# Patient Record
(demographics unavailable — no encounter records)

---

## 2024-10-09 NOTE — HISTORY OF PRESENT ILLNESS
[FreeTextEntry1] : Mr. 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 His other comorbidity includes erythromelalgias (leading to foot swelling and pain) but as per neurologist is neuropathy (not erythromelalgias). Followed by Dr. Thayer (primary cardiologist). Accompanied by wife.   For full initial details, please refer to note from 1/24/23.   Since last visit, weight has reduced to 176 pounds. Previously MEMS was discussed but they were not interested.   He was seen by neurologist who rpeviously was treating him for neuropathy with some infusion (stopped July as was ineffective and was causing diaphoresis). Continues to have pain. Seen vascular without any intervenable issue. He takes an oxycodone and gabapentin 200 mg qhs to sleep. Reportedly has sundowning at night.   Takes torsemide 40 mg daily with good effect with occasional 60 mg as needed. Does report fluid indiscretion.   He reports a good appetite and has been more compliant with salt limitation. His main complaint is pain in his feet when trying to walk. No recent episodes of gout. As per his wife, he was previously taken off hydralazine due to malar facial rash. Has had worsening cognitive issues.    Reports no vision in R eye from prior stroke; L eye developed cataract and requires surgery. Walks around the house and can walk up to 1 block without dyspnea but is limited by leg pain/weakness. He sleeps with +2 pillows but reports overt orthopnea or PND. Does not check his BP at home. Eats about 3 meals daily and admits to not following a low sodium diet, he drinks about 1.5 liters of fluid a day.   He denies chest pain, palpitations, and no episodes of syncope, no ICD shocks. Reports depression - per wife, had attempted to ingest excess oxycodone so has hid them from him.

## 2024-10-09 NOTE — ASSESSMENT
[FreeTextEntry1] : Mr. 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 His other comorbidity includes erythromelalgias (leading to foot swelling and pain) but as per neurologist is neuropathy (not erythromelalgias). He is ACC/AHA Stage C HF, endorsing NYHA Class II symptoms however unclear if it's related to debility or heart failure. He is mildly volume overloaded and normotensive but with edema in legs R>L.   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 - s/p 2 Mitraclips on 3/31 with improvement to mild MR  3. CKD - Cr 1.5-1.9 - SGLT2i as above  4. VT - s/p ICD for secondary prevention  - no recent therapies   5. Chronic atrial fibrillation. - rate controlled on Toprol XL 50 mg daily - continue AC with Eliquis 5mg BID  6. 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 Depression - encouraged psychiatry consult  RTC 3 months with NP, 6 months with me

## 2024-10-09 NOTE — PHYSICAL EXAM
[Well Developed] : well developed [Well Nourished] : well nourished [No Acute Distress] : no acute distress [No Xanthelasma] : no xanthelasma [Normal S1, S2] : normal S1, S2 [No Gallop] : no gallop [Clear Lung Fields] : clear lung fields [Soft] : abdomen soft [Non Tender] : non-tender [No Rash] : no rash [Moves all extremities] : moves all extremities [No Focal Deficits] : no focal deficits [Normal Speech] : normal speech [Alert and Oriented] : alert and oriented [de-identified] : debilitated [de-identified] : JVP 8-10 cm w/ HJR [de-identified] : irreg irreg rhythm; no m/r/g, Left chest ICD [de-identified] : slow gait, uses a cane [de-identified] : b/l feet blue, edematous up to shins

## 2024-10-09 NOTE — CARDIOLOGY SUMMARY
[de-identified] : 10/9/24 - afib, RV paced 7/10/24 afib, RV paced 4/10/24 AF, RBBB, intermittent paced, rate 56 bpm 1/3/24 - afib, RBBB, intermittent paced 11/8/23  AF 58 with RBBB, LAFB, NSST 9/8/23 - unchanged 8/9/23 AF 86 with RBBB, LAFB, NSST 4/23/23 - afib, RV paced [de-identified] : 11/8/23 - normal LV and RV function, dilated LA, mitral clip with mild-mod MR (eccentric?), mild AI, LVOT VTI 15 cm, IVC small and collapsed  11/17/22 - LVIDD 5.4 cm, LVEF 50%, normal RV size and function mildly dilated LA, RA not well visualized, S/p mitral clip mild regurgitation, mild to moderate aortic regurgitation, mild tricuspid regurgitation.  3/29/22 TTE: RVSP 54mmHg  3/24/22 TTE: grossly normal LVSF, no LVOT obs, RVE, decreased RVSF, nml RA, sev dilated LA, sev MR, mild AR, mild TR,  [de-identified] : \par  3/30/22 LHC: two vessel mild non-obs CAD, LVEDP 8 mmHg

## 2024-10-09 NOTE — ADDENDUM
[FreeTextEntry1] : Called and left a message with partner Rachelle with results of labs notable for mild improvement in BUN/creat 41/1.63 with K 3.9 from 1.90 and serum pro BNP 5800 from 5200. Diuretics were adjusted during the appt.

## 2024-10-30 NOTE — PHYSICAL EXAM
[1+] : left 1+ [Ankle Swelling Bilaterally] : bilaterally  [Ankle Swelling On The Left] : moderate [] : bilaterally [Ankle Swelling On The Right] : mild [Purpura] : purpura [Petechiae] : petechiae [Skin Ulcer] : ulcer [Skin Induration] : induration [Oriented to Person] : oriented to person [Oriented to Place] : oriented to place [Oriented to Time] : oriented to time [Varicose Veins Of Lower Extremities] : not present [FreeTextEntry1] : cap refill >5 seconds. [de-identified] : manual muscle testing 3/5 in all four muscle groups [de-identified] : Purple red discoloration of b/l lower extremities

## 2024-10-30 NOTE — PHYSICAL EXAM
[1+] : left 1+ [Ankle Swelling Bilaterally] : bilaterally  [Ankle Swelling On The Left] : moderate [] : bilaterally [Ankle Swelling On The Right] : mild [Purpura] : purpura [Petechiae] : petechiae [Skin Ulcer] : ulcer [Skin Induration] : induration [Oriented to Person] : oriented to person [Oriented to Place] : oriented to place [Oriented to Time] : oriented to time [Varicose Veins Of Lower Extremities] : not present [FreeTextEntry1] : cap refill >5 seconds. [de-identified] : manual muscle testing 3/5 in all four muscle groups [de-identified] : Purple red discoloration of b/l lower extremities

## 2024-10-30 NOTE — PLAN
[FreeTextEntry1] : 76M presents for left foot wounds and b/l purple, red discoloration - Pt seen and evaluated - Afebrile - Left foot hallux wound aprtial thickness and not beyond, no purulence, no fluctuance, no malodor - Left foot posterior calcaneus wound to dermis and not beyond, no purulence, no malodor - Right heel lateral well adhered eschar, no acute signs of infection  - Discussed the importance of elevating b/l lower extremities - Continue application of mupirocin on wounds  - Continue application of ammonium lactate to apply 2x daily for bilateral lower extremities - Topical Abx to be applied daily to left lower extremity wounds - Recommend consideration of Calcium channel blocker for Renaulds.  - RTC PRN

## 2024-10-30 NOTE — HISTORY OF PRESENT ILLNESS
[FreeTextEntry1] : Pt is seen for follow up management of wounds on the left foot to the hallux and heel. States he is feeling better but still in pain especially the heel. Denies f/c/n/v/d.

## 2024-11-14 NOTE — ASSESSMENT
[FreeTextEntry1] : Plan - Renew prescription for Farxiga. - Nifedipine for Raynaud's - Consider starting patient on an antidepressant, but this decision should be made by a psychiatrist. There is an interaction between sertraline and Cymbalta - Continue HF regimen, not currently decompensated - The patient has no active cardiac conditions, and may safely proceed with the planned procedure, including sedation or GET as clinically indicated. No further cardiac testing is required.

## 2024-11-14 NOTE — REASON FOR VISIT
[Symptom and Test Evaluation] : symptom and test evaluation [Cardiac Failure] : cardiac failure [FreeTextEntry1] : Chief complaint - Patient is experiencing difficulty seeing in both eyes, one due to a stroke and the other due to a cataract.  - Patient is also experiencing pain in his hands and feet, and has wounds on his legs due to poor blood circulation.  - Patient is also experiencing depression. - No angina or dyspnea  History of present illness - Patient has been experiencing pain in his hands and feet, and has wounds on his legs due to poor blood circulation.  - Patient has been experiencing depression. - Patient has been seen by a wound doctor and a vascular doctor, but his condition has not improved.  - Patient has been taking oxycodone for pain relief.   Past surgical history Patient is scheduled for cataract surgery on November 19, 2024.  Social history - Patient is cared for at home by a family member, who is seeking additional assistance due to the patient's deteriorating condition.  - Patient is unable to leave the house due to his condition.  Assessment - Scheduled for cataract surgery - Patient is also experiencing peripheral neuropathy and has ulcers on his lower extremities secondary to peripheral vascular disease. - Patient is also experiencing depression and has expressed suicidal ideation.  Plan - Renew prescription for Farxiga. - Nifedipine for Raynaud's - Consider starting patient on an antidepressant, but this decision should be made by a psychiatrist. There is an interaction between sertraline and Cymbalta - Continue HF regimen, not currently decompensated - The patient has no active cardiac conditions, and may safely proceed with the planned procedure, including sedation or GET as clinically indicated. No further cardiac testing is required.  Prescription - Renewed prescription for Farxiga. - Nifedipine, 30 mg, a calcium channel blocker.  Appointments - Follow-up appointment scheduled for January 5th or 15th, 2025. - Cataract surgery scheduled for November 19, 2024.  ICD-10 codes (7) - Depression, unspecified [F32.A] - Type 2 diabetes mellitus without complications [E11.9] - Essential (primary) hypertension [I10] - Unspecified cataract [H26.9] - Cerebrovascular disease, unspecified [I67.9] - Pain in unspecified hand [M79.643] - Pain in unspecified foot [M79.673]

## 2024-11-14 NOTE — PHYSICAL EXAM
[Well Developed] : well developed [Well Nourished] : well nourished [No Acute Distress] : no acute distress [Normal Conjunctiva] : normal conjunctiva [Normal Venous Pressure] : normal venous pressure [No Carotid Bruit] : no carotid bruit [Normal S1, S2] : normal S1, S2 [No Murmur] : no murmur [No Rub] : no rub [No Gallop] : no gallop [Clear Lung Fields] : clear lung fields [Good Air Entry] : good air entry [No Respiratory Distress] : no respiratory distress  [Soft] : abdomen soft [Non Tender] : non-tender [No Masses/organomegaly] : no masses/organomegaly [Normal Bowel Sounds] : normal bowel sounds [Normal Gait] : normal gait [No Cyanosis] : no cyanosis [No Clubbing] : no clubbing [No Varicosities] : no varicosities [No Focal Deficits] : no focal deficits [Normal Speech] : normal speech [de-identified] : 1+ LE edema to mid shin [de-identified] : Blue discoloration bilaterally to legs and feet, consistent with known vascular disease [de-identified] : Depressed, tearful

## 2025-01-08 NOTE — PHYSICAL EXAM
[Well Developed] : well developed [Well Nourished] : well nourished [No Acute Distress] : no acute distress [No Xanthelasma] : no xanthelasma [Normal S1, S2] : normal S1, S2 [No Gallop] : no gallop [Clear Lung Fields] : clear lung fields [Soft] : abdomen soft [Non Tender] : non-tender [No Rash] : no rash [Moves all extremities] : moves all extremities [No Focal Deficits] : no focal deficits [Normal Speech] : normal speech [Alert and Oriented] : alert and oriented [No Respiratory Distress] : no respiratory distress  [de-identified] : debilitated [de-identified] : JVP 8-10 cm w/ HJR [de-identified] : irreg irreg rhythm; no m/r/g, Left chest ICD [de-identified] : very weak, unable to get onto exam table [de-identified] : b/l feet blue, edematous up to shins, cold LE B/L

## 2025-01-08 NOTE — CARDIOLOGY SUMMARY
[de-identified] : 1/6/25 Unclear baseline, Bradycardia, PRWP, V-Paced?  10/9/24 - afib, RV paced 7/10/24 afib, RV paced 4/10/24 AF, RBBB, intermittent paced, rate 56 bpm 1/3/24 - afib, RBBB, intermittent paced 11/8/23  AF 58 with RBBB, LAFB, NSST 9/8/23 - unchanged 8/9/23 AF 86 with RBBB, LAFB, NSST 4/23/23 - afib, RV paced [de-identified] : 11/8/23 - normal LV and RV function, dilated LA, mitral clip with mild-mod MR (eccentric?), mild AI, LVOT VTI 15 cm, IVC small and collapsed  11/17/22 - LVIDD 5.4 cm, LVEF 50%, normal RV size and function mildly dilated LA, RA not well visualized, S/p mitral clip mild regurgitation, mild to moderate aortic regurgitation, mild tricuspid regurgitation.  3/29/22 TTE: RVSP 54mmHg  3/24/22 TTE: grossly normal LVSF, no LVOT obs, RVE, decreased RVSF, nml RA, sev dilated LA, sev MR, mild AR, mild TR,  [de-identified] : Device: 11/7/24, No shocks, 100%afib, No RA pacing or CRT. [de-identified] : \par  3/30/22 LHC: two vessel mild non-obs CAD, LVEDP 8 mmHg

## 2025-01-08 NOTE — ASSESSMENT
[FreeTextEntry1] : Mr. 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 His other comorbidity includes erythromelalgias (leading to foot swelling and pain) but as per neurologist is neuropathy (not erythromelalgias). He is ACC/AHA Stage C HF, endorsing NYHA Class IIl symptoms. However, unclear if it's related to debility or heart failure. He is mildly volume overloaded and normotensive but with edema in legs R>L and very weak.    1.HFpEF - high concern for Decompensation, cool peripherally, discussed with Dr. 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 - s/p 2 Mitraclips on 3/31 with improvement to mild MR  3. CKD - Cr 1.5-1.9 - SGLT2i as above  4. VT - s/p ICD for secondary prevention  - no recent therapies   5. Chronic atrial fibrillation. - rate controlled on Toprol XL 50 mg daily - continue AC with Eliquis 5mg BID  6. 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 Depression - encouraged psychiatry consult  RTC after hospitalization

## 2025-01-08 NOTE — CARDIOLOGY SUMMARY
[de-identified] : 1/6/25 Unclear baseline, Bradycardia, PRWP, V-Paced?  10/9/24 - afib, RV paced 7/10/24 afib, RV paced 4/10/24 AF, RBBB, intermittent paced, rate 56 bpm 1/3/24 - afib, RBBB, intermittent paced 11/8/23  AF 58 with RBBB, LAFB, NSST 9/8/23 - unchanged 8/9/23 AF 86 with RBBB, LAFB, NSST 4/23/23 - afib, RV paced [de-identified] : Device: 11/7/24, No shocks, 100%afib, No RA pacing or CRT. [de-identified] : 11/8/23 - normal LV and RV function, dilated LA, mitral clip with mild-mod MR (eccentric?), mild AI, LVOT VTI 15 cm, IVC small and collapsed  11/17/22 - LVIDD 5.4 cm, LVEF 50%, normal RV size and function mildly dilated LA, RA not well visualized, S/p mitral clip mild regurgitation, mild to moderate aortic regurgitation, mild tricuspid regurgitation.  3/29/22 TTE: RVSP 54mmHg  3/24/22 TTE: grossly normal LVSF, no LVOT obs, RVE, decreased RVSF, nml RA, sev dilated LA, sev MR, mild AR, mild TR,  [de-identified] : \par  3/30/22 LHC: two vessel mild non-obs CAD, LVEDP 8 mmHg

## 2025-01-08 NOTE — PHYSICAL EXAM
[Well Developed] : well developed [Well Nourished] : well nourished [No Acute Distress] : no acute distress [No Xanthelasma] : no xanthelasma [Normal S1, S2] : normal S1, S2 [No Gallop] : no gallop [Clear Lung Fields] : clear lung fields [Soft] : abdomen soft [Non Tender] : non-tender [No Rash] : no rash [Moves all extremities] : moves all extremities [No Focal Deficits] : no focal deficits [Normal Speech] : normal speech [Alert and Oriented] : alert and oriented [No Respiratory Distress] : no respiratory distress  [de-identified] : debilitated [de-identified] : JVP 8-10 cm w/ HJR [de-identified] : irreg irreg rhythm; no m/r/g, Left chest ICD [de-identified] : very weak, unable to get onto exam table [de-identified] : b/l feet blue, edematous up to shins, cold LE B/L

## 2025-01-08 NOTE — ASSESSMENT
[FreeTextEntry1] : Mr. 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 His other comorbidity includes erythromelalgias (leading to foot swelling and pain) but as per neurologist is neuropathy (not erythromelalgias). He is ACC/AHA Stage C HF, endorsing NYHA Class IIl symptoms. However, unclear if it's related to debility or heart failure. He is mildly volume overloaded and normotensive but with edema in legs R>L and very weak.    1.HFpEF - high concern for Decompensation, cool peripherally, discussed with Dr. 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 - s/p 2 Mitraclips on 3/31 with improvement to mild MR  3. CKD - Cr 1.5-1.9 - SGLT2i as above  4. VT - s/p ICD for secondary prevention  - no recent therapies   5. Chronic atrial fibrillation. - rate controlled on Toprol XL 50 mg daily - continue AC with Eliquis 5mg BID  6. 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 Depression - encouraged psychiatry consult  RTC after hospitalization

## 2025-01-08 NOTE — PHYSICAL EXAM
[Well Developed] : well developed [Well Nourished] : well nourished [No Acute Distress] : no acute distress [No Xanthelasma] : no xanthelasma [Normal S1, S2] : normal S1, S2 [No Gallop] : no gallop [Clear Lung Fields] : clear lung fields [Soft] : abdomen soft [Non Tender] : non-tender [No Rash] : no rash [Moves all extremities] : moves all extremities [No Focal Deficits] : no focal deficits [Normal Speech] : normal speech [Alert and Oriented] : alert and oriented [No Respiratory Distress] : no respiratory distress  [de-identified] : debilitated [de-identified] : JVP 8-10 cm w/ HJR [de-identified] : irreg irreg rhythm; no m/r/g, Left chest ICD [de-identified] : very weak, unable to get onto exam table [de-identified] : b/l feet blue, edematous up to shins, cold LE B/L

## 2025-01-08 NOTE — CARDIOLOGY SUMMARY
[de-identified] : 1/6/25 Unclear baseline, Bradycardia, PRWP, V-Paced?  10/9/24 - afib, RV paced 7/10/24 afib, RV paced 4/10/24 AF, RBBB, intermittent paced, rate 56 bpm 1/3/24 - afib, RBBB, intermittent paced 11/8/23  AF 58 with RBBB, LAFB, NSST 9/8/23 - unchanged 8/9/23 AF 86 with RBBB, LAFB, NSST 4/23/23 - afib, RV paced [de-identified] : 11/8/23 - normal LV and RV function, dilated LA, mitral clip with mild-mod MR (eccentric?), mild AI, LVOT VTI 15 cm, IVC small and collapsed  11/17/22 - LVIDD 5.4 cm, LVEF 50%, normal RV size and function mildly dilated LA, RA not well visualized, S/p mitral clip mild regurgitation, mild to moderate aortic regurgitation, mild tricuspid regurgitation.  3/29/22 TTE: RVSP 54mmHg  3/24/22 TTE: grossly normal LVSF, no LVOT obs, RVE, decreased RVSF, nml RA, sev dilated LA, sev MR, mild AR, mild TR,  [de-identified] : Device: 11/7/24, No shocks, 100%afib, No RA pacing or CRT. [de-identified] : \par  3/30/22 LHC: two vessel mild non-obs CAD, LVEDP 8 mmHg

## 2025-01-08 NOTE — HISTORY OF PRESENT ILLNESS
[FreeTextEntry1] : Mr. 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 His other comorbidity includes erythromelalgias (leading to foot swelling and pain) but as per neurologist is neuropathy (not erythromelalgias). Followed by Dr. Thayer (primary cardiologist). Accompanied by wife.   For full initial details, please refer to note from 1/24/23.   Previously MEMS was discussed but they were not interested.   He was seen by neurologist who previously was treating him for neuropathy with some infusion (stopped July as was ineffective and was causing diaphoresis). Continues to have pain. Seen vascular without any intervenable issue.    As per his wife, he was previously taken off hydralazine due to malar facial rash. Has had worsening cognitive issues.   Patient comes in today on 1/6/25 for follow up.  No hospitalization or ER visits since last visit on 10/9/24. Comes with Rachelle, his partner of 25 years.  Activity tolerance: Walks from bedroom to bathroom and living room at very slow pace with SOB and fatigue.  In last 1-2 months he wants to sleep more often.  Currently, he needs assistance with all ADLs due to SOB and significant fatigue. Activity worse than in past.  He also is limited by severe neuropathy.   He has CP on occasion with heaviness in chest, with resolution with rest.   Poor appetite but has daily BM.   -Weight: at home 174-176 lbs, in office today 174 lbs. Has not needed extra diuretics. -BP: at home doesn't take, in office today 115/81 -HR:50 - unable to get O2 sat due to poor circulation.      EKG 1/6/25 Unclear baseline, Bradycardia, PRWP, V-Paced?  Device: 11/7/24, No shocks, 100%afib, No RA pacing or CRT. 
[FreeTextEntry1] : Mr. 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 His other comorbidity includes erythromelalgias (leading to foot swelling and pain) but as per neurologist is neuropathy (not erythromelalgias). Followed by Dr. Thayer (primary cardiologist). Accompanied by wife.   For full initial details, please refer to note from 1/24/23.   Previously MEMS was discussed but they were not interested.   He was seen by neurologist who previously was treating him for neuropathy with some infusion (stopped July as was ineffective and was causing diaphoresis). Continues to have pain. Seen vascular without any intervenable issue.    As per his wife, he was previously taken off hydralazine due to malar facial rash. Has had worsening cognitive issues.   Patient comes in today on 1/6/25 for follow up.  No hospitalization or ER visits since last visit on 10/9/24. Comes with Rachelle, his partner of 25 years.  Activity tolerance: Walks from bedroom to bathroom and living room at very slow pace with SOB and fatigue.  In last 1-2 months he wants to sleep more often.  Currently, he needs assistance with all ADLs due to SOB and significant fatigue. Activity worse than in past.  He also is limited by severe neuropathy.   He has CP on occasion with heaviness in chest, with resolution with rest.   Poor appetite but has daily BM.   -Weight: at home 174-176 lbs, in office today 174 lbs. Has not needed extra diuretics. -BP: at home doesn't take, in office today 115/81 -HR:50 - unable to get O2 sat due to poor circulation.      EKG 1/6/25 Unclear baseline, Bradycardia, PRWP, V-Paced?  Device: 11/7/24, No shocks, 100%afib, No RA pacing or CRT. 
[FreeTextEntry1] : Mr. 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 His other comorbidity includes erythromelalgias (leading to foot swelling and pain) but as per neurologist is neuropathy (not erythromelalgias). Followed by Dr. Thayer (primary cardiologist). Accompanied by wife.   For full initial details, please refer to note from 1/24/23.   Previously MEMS was discussed but they were not interested.   He was seen by neurologist who previously was treating him for neuropathy with some infusion (stopped July as was ineffective and was causing diaphoresis). Continues to have pain. Seen vascular without any intervenable issue.    As per his wife, he was previously taken off hydralazine due to malar facial rash. Has had worsening cognitive issues.   Patient comes in today on 1/6/25 for follow up.  No hospitalization or ER visits since last visit on 10/9/24. Comes with Rachelle, his partner of 25 years.  Activity tolerance: Walks from bedroom to bathroom and living room at very slow pace with SOB and fatigue.  In last 1-2 months he wants to sleep more often.  Currently, he needs assistance with all ADLs due to SOB and significant fatigue. Activity worse than in past.  He also is limited by severe neuropathy.   He has CP on occasion with heaviness in chest, with resolution with rest.   Poor appetite but has daily BM.   -Weight: at home 174-176 lbs, in office today 174 lbs. Has not needed extra diuretics. -BP: at home doesn't take, in office today 115/81 -HR:50 - unable to get O2 sat due to poor circulation.      EKG 1/6/25 Unclear baseline, Bradycardia, PRWP, V-Paced?  Device: 11/7/24, No shocks, 100%afib, No RA pacing or CRT. 
pt re-wrapped in ace- wrap for comfort, pt states that pain is better once rewrapped, advised pt to elevate hand in case of worsening swelling- SD

## 2025-01-30 NOTE — PHYSICAL EXAM
[Well Developed] : well developed [Well Nourished] : well nourished [No Acute Distress] : no acute distress [No Xanthelasma] : no xanthelasma [Normal S1, S2] : normal S1, S2 [No Gallop] : no gallop [Clear Lung Fields] : clear lung fields [No Respiratory Distress] : no respiratory distress  [Soft] : abdomen soft [Non Tender] : non-tender [No Rash] : no rash [Moves all extremities] : moves all extremities [No Focal Deficits] : no focal deficits [Normal Speech] : normal speech [Alert and Oriented] : alert and oriented [de-identified] : debilitated [de-identified] : JVP 8-10 cm w/ HJR [de-identified] : irreg irreg rhythm; no m/r/g, Left chest ICD [de-identified] : very weak, unable to get onto exam table [de-identified] : b/l feet blue, edematous up to shins, cold LE B/L

## 2025-01-30 NOTE — CARDIOLOGY SUMMARY
[de-identified] : 1/6/25 Unclear baseline, Bradycardia, PRWP, V-Paced?  10/9/24 - afib, RV paced 7/10/24 afib, RV paced 4/10/24 AF, RBBB, intermittent paced, rate 56 bpm 1/3/24 - afib, RBBB, intermittent paced 11/8/23  AF 58 with RBBB, LAFB, NSST 9/8/23 - unchanged 8/9/23 AF 86 with RBBB, LAFB, NSST 4/23/23 - afib, RV paced [de-identified] : 11/8/23 - normal LV and RV function, dilated LA, mitral clip with mild-mod MR (eccentric?), mild AI, LVOT VTI 15 cm, IVC small and collapsed  11/17/22 - LVIDD 5.4 cm, LVEF 50%, normal RV size and function mildly dilated LA, RA not well visualized, S/p mitral clip mild regurgitation, mild to moderate aortic regurgitation, mild tricuspid regurgitation.  3/29/22 TTE: RVSP 54mmHg  3/24/22 TTE: grossly normal LVSF, no LVOT obs, RVE, decreased RVSF, nml RA, sev dilated LA, sev MR, mild AR, mild TR,  [de-identified] : Device: 11/7/24, No shocks, 100%afib, No RA pacing or CRT. [de-identified] : \par  3/30/22 LHC: two vessel mild non-obs CAD, LVEDP 8 mmHg

## 2025-01-30 NOTE — HISTORY OF PRESENT ILLNESS
[FreeTextEntry1] : Mr. 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 His other comorbidity includes erythromelalgias (leading to foot swelling and pain) but as per neurologist is neuropathy (not erythromelalgias). Followed by Dr. Thayer (primary cardiologist). Accompanied by wife.   For full initial details, please refer to note from 1/24/23.   Previously [facility] was discussed but they were not interested.   He was seen by neurologist who previously was treating him for neuropathy with some infusion (stopped July as was ineffective and was causing diaphoresis). Continues to have pain. Seen vascular without any intervenable issue.    As per his wife, he was previously taken off hydralazine due to malar facial rash. Has had worsening cognitive issues.   Patient comes in today on 1/6/25 for follow up.  No hospitalization or ER visits since last visit on 10/9/24. Comes with Rachelle, his partner of 25 years.  Activity tolerance: Walks from bedroom to bathroom and living room at very slow pace with SOB and fatigue.  In last 1-2 months he wants to sleep more often.  Currently, he needs assistance with all ADLs due to SOB and significant fatigue. Activity worse than in past.  He also is limited by severe neuropathy.   He has CP on occasion with heaviness in chest, with resolution with rest.   Poor appetite but has daily BM.   -Weight: at home 174-176 lbs, in office today 174 lbs. Has not needed extra diuretics. -BP: at home doesn't take, in office today 115/81 -HR:50 - unable to get O2 sat due to poor circulation.      EKG 1/6/25 Unclear baseline, Bradycardia, PRWP, V-Paced?  Device: 11/7/24, No shocks, 100%afib, No RA pacing or CRT.

## 2025-05-22 NOTE — ASSESSMENT
[FreeTextEntry1] : reviewed in patient [facility] notes form ED, nephrology and internal medicine  noted acute urinary retention with risk of organ dysfunction to bladder kidney and subsequent systemic dysfunction without addressing emergently with catheter.  catheter placed with > 1L return.

## 2025-05-22 NOTE — HISTORY OF PRESENT ILLNESS
[FreeTextEntry1] : 76 year old male presents to the office with wife and aide for a c/o urinary retention. He underwent TURP 11/30/2025 with Dr Hernandez. However, he was recently hospitalized and required a catheter. He is currently in a rehab which removed patient catheter which he was discharged with it.

## 2025-05-22 NOTE — HISTORY OF PRESENT ILLNESS
[FreeTextEntry1] : 76 year old male presents to the office with wife and aide for a c/o urinary retention. He underwent TURP 11/30/2025 with Dr Hernandez. However, he was recently hospitalized and required a catheter. He is currently in a rehab which removed patient catheter which he was discharged with it.  backpack

## 2025-05-22 NOTE — ASSESSMENT
[FreeTextEntry1] : reviewed in patient [facility] notes form ED, nephrology and internal medicine  noted acute urinary retention with risk of organ dysfunction to bladder kidney and subsequent systemic dysfunction without addressing emergently with catheter.  catheter placed with > 1L return.

## 2025-06-25 NOTE — ASSESSMENT
Chief Complaint   Patient presents with    Convey Results       Medications reviewed and updated.  Pharmacy verified.       Health Maintenance       COVID-19 Vaccine (7 - 2024-25 season)  Overdue since 9/1/2024    Shingles Vaccine (1 of 2)  Never done    Respiratory Syncytial Virus (RSV) Vaccine 60+ (1 - Risk 60-74 years 1-dose series)  Never done    Medicare Advantage- Medicare Wellness Visit (Yearly - January to December)  Scheduled for 10/1/2025           Following review of the above:  Patient wishes to discuss with clinician: COVID-19, Respiratory Syncytial Virus (RSV), and Shingles    Note: Refer to final orders and clinician documentation.                     [FreeTextEntry1] : Mr. 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 His other comorbidity includes erythromelalgias (leading to foot swelling and pain) but as per neurologist is neuropathy (not erythromelalgias). He is ACC/AHA Stage C HF, endorsing NYHA Class IIl symptoms. However, unclear if it's related to debility or heart failure. He is mildly volume overloaded and normotensive but with edema in legs R>L and very weak.    1.HFpEF - high concern for Decompensation, cool peripherally, discussed with Dr. 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 - s/p 2 Mitraclips on 3/31 with improvement to mild MR  3. CKD - Cr 1.5-1.9 - SGLT2i as above  4. VT - s/p ICD for secondary prevention  - no recent therapies   5. Chronic atrial fibrillation. - rate controlled on Toprol XL 50 mg daily - continue AC with Eliquis 5mg BID  6. 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 Depression - encouraged psychiatry consult  RTC after hospitalization